# Patient Record
Sex: MALE | Race: WHITE | NOT HISPANIC OR LATINO | URBAN - METROPOLITAN AREA
[De-identification: names, ages, dates, MRNs, and addresses within clinical notes are randomized per-mention and may not be internally consistent; named-entity substitution may affect disease eponyms.]

---

## 2018-10-02 ENCOUNTER — OFFICE VISIT (OUTPATIENT)
Dept: FAMILY MEDICINE | Facility: CLINIC | Age: 28
End: 2018-10-02

## 2018-10-02 VITALS
DIASTOLIC BLOOD PRESSURE: 72 MMHG | WEIGHT: 215 LBS | HEART RATE: 80 BPM | SYSTOLIC BLOOD PRESSURE: 108 MMHG | BODY MASS INDEX: 29.12 KG/M2 | RESPIRATION RATE: 12 BRPM | HEIGHT: 72 IN

## 2018-10-02 DIAGNOSIS — R61 GENERALIZED HYPERHIDROSIS: Primary | ICD-10-CM

## 2018-10-02 PROCEDURE — 80053 COMPREHEN METABOLIC PANEL: CPT | Mod: 90 | Performed by: NURSE PRACTITIONER

## 2018-10-02 PROCEDURE — 99213 OFFICE O/P EST LOW 20 MIN: CPT | Performed by: NURSE PRACTITIONER

## 2018-10-02 PROCEDURE — 36415 COLL VENOUS BLD VENIPUNCTURE: CPT | Performed by: NURSE PRACTITIONER

## 2018-10-02 RX ORDER — OXYBUTYNIN CHLORIDE 5 MG/1
15 TABLET ORAL 2 TIMES DAILY
Qty: 180 TABLET | Refills: 3 | Status: SHIPPED | OUTPATIENT
Start: 2018-10-02 | End: 2018-12-31

## 2018-10-02 RX ORDER — OXYBUTYNIN CHLORIDE 5 MG/1
15 TABLET, EXTENDED RELEASE ORAL DAILY
COMMUNITY
End: 2019-08-09

## 2018-10-02 NOTE — LETTER
October 3, 2018      Jacob Gunn  [address] [facility] [address]        Dear ,    We are writing to inform you of your test results. CMP okay    Resulted Orders   Comp. Metabolic Panel (14) ([facility])   Result Value Ref Range    Glucose 99 65 - 99 mg/dL    Urea Nitrogen 12 6 - 20 mg/dL    Creatinine 0.81 0.76 - 1.27 mg/dL    eGFR If NonAfricn Am 121 >59 mL/min/1.73    eGFR If Africn Am 140 >59 mL/min/1.73    BUN/Creatinine Ratio 15 9 - 20    Sodium 143 134 - 144 mmol/L    Potassium 4.4 3.5 - 5.2 mmol/L    Chloride 103 96 - 106 mmol/L    Total CO2 26 20 - 29 mmol/L    Calcium 9.5 8.7 - 10.2 mg/dL    Protein Total 7.0 6.0 - 8.5 g/dL    Albumin 4.9 3.5 - 5.5 g/dL    Globulin, Total 2.1 1.5 - 4.5 g/dL    A/G Ratio 2.3 (H) 1.2 - 2.2    Bilirubin Total 1.1 0.0 - 1.2 mg/dL    Alkaline Phosphatase 36 (L) 39 - 117 IU/L    AST 16 0 - 40 IU/L    ALT 16 0 - 44 IU/L    Narrative    Performed at:  01 - [facility] [address], [place], [place]  [id number]  : Jong Carlson MD, Phone:  [phone]     If you have any questions or concerns, please call the clinic at the number listed above.     Sincerely,    CAMDEN Fatima CNP

## 2018-10-02 NOTE — PROGRESS NOTES
Received fax from pharmacy that patient's rx for Oxybutynin needs PA for quantity of #180.  Submitted PA by fax to [employer].  Will wait to hear back.

## 2018-10-02 NOTE — MR AVS SNAPSHOT
"              After Visit Summary   10/2/2018    Jacob Gunn    MRN: [id number]           Patient Information     Date Of Birth          1990        Visit Information        Provider Department      10/2/2018 8:15 AM Astrid Meyer APRN CNP [facility]        Today's Diagnoses     Generalized hyperhidrosis    -  1       Follow-ups after your visit        Follow-up notes from your care team     Return in about 1 year (around 10/2/2019).      Who to contact     If you have questions or need follow up information about today's clinic visit or your schedule please contact [facility] directly at [phone].  Normal or non-critical lab and imaging results will be communicated to you by MyChart, letter or phone within 4 business days after the clinic has received the results. If you do not hear from us within 7 days, please contact the clinic through [email or web address] or phone. If you have a critical or abnormal lab result, we will notify you by phone as soon as possible.  Submit refill requests through [employer] or call your pharmacy and they will forward the refill request to us. Please allow 3 business days for your refill to be completed.          Additional Information About Your Visit        MyChart Information     [employer] lets you send messages to your doctor, view your test results, renew your prescriptions, schedule appointments and more. To sign up, go to [email or web address] . Click on \"Log in\" on the left side of the screen, which will take you to the Welcome page. Then click on \"Sign up Now\" on the right side of the page.     You will be asked to enter the access code listed below, as well as some personal information. Please follow the directions to create your username and password.     Your access code is: [email or web address]  Expires: 2018  8:58 AM     Your access code will  in 90 days. If you need help or a new code, please call your [place] clinic or [phone].      "   Care EveryWhere ID     This is your Care EveryWhere ID. This could be used by other organizations to access your [facility] medical records  [id number]        Your Vitals Were     Pulse Respirations Height BMI (Body Mass Index)          80 12 1.829 m (6') 29.16 kg/m2         Blood Pressure from Last 3 Encounters:   10/02/18 108/72   08/14/13 130/72   05/19/11 128/82    Weight from Last 3 Encounters:   10/02/18 97.5 kg (215 lb)   08/14/13 122.9 kg (271 lb)   05/19/11 103.7 kg (228 lb 9.6 oz)              We Performed the Following     Comp. Metabolic Panel (14) ([facility])          Today's Medication Changes          These changes are accurate as of 10/2/18  8:58 AM.  If you have any questions, ask your nurse or doctor.               These medicines have changed or have updated prescriptions.        Dose/Directions    * oxybutynin 5 MG tablet   Commonly known as:  DITROPAN   This may have changed:  You were already taking a medication with the same name, and this prescription was added. Make sure you understand how and when to take each.   Used for:  Generalized hyperhidrosis   Changed by:  Astrid Meyer APRN CNP        Dose:  15 mg   Take 3 tablets (15 mg) by mouth 2 times daily   Quantity:  180 tablet   Refills:  3       * oxybutynin 5 MG 24 hr tablet   Commonly known as:  DITROPAN-XL   This may have changed:  Another medication with the same name was added. Make sure you understand how and when to take each.   Changed by:  Astrid Meyer APRN CNP        Dose:  15 mg   Take 15 mg by mouth daily sweats   Refills:  0       * Notice:  This list has 2 medication(s) that are the same as other medications prescribed for you. Read the directions carefully, and ask your doctor or other care provider to review them with you.         Where to get your medicines      These medications were sent to [id number] [place]  [address] [facility] [address]  [address]  [address]     Phone:  [phone]     oxybutynin 5 MG tablet                Primary Care Provider Office Phone # Fax #    Adrien Young [phone] [address]  [facility] [address]        Equal Access to Services     [facility] HospitalFL[facility] : Hadii aad ku hadasho Soomaali, waaxda luqadaha, qaybta kaalmada adeegyada, waxay idiin hayaan adeeg ervin laeduardon . So [facility] [phone].    ATENCIÓN: Si habla español, tiene a swanson disposición servicios gratuitos de asistencia lingüística. Llame al [phone].    We comply with applicable federal civil rights laws and [place] laws. We do not discriminate on the basis of race, color, national origin, age, disability, sex, sexual orientation, or gender identity.            Thank you!     Thank you for choosing [facility]  for your care. Our goal is always to provide you with excellent care. Hearing back from our patients is one way we can continue to improve our services. Please take a few minutes to complete the written survey that you may receive in the mail after your visit with us. Thank you!             Your Updated Medication List - Protect others around you: Learn how to safely use, store and throw away your medicines at www.disposemymeds.org.          This list is accurate as of 10/2/18  8:58 AM.  Always use your most recent med list.                   Brand Name Dispense Instructions for use Diagnosis    * oxybutynin 5 MG tablet    DITROPAN    180 tablet    Take 3 tablets (15 mg) by mouth 2 times daily    Generalized hyperhidrosis       * oxybutynin 5 MG 24 hr tablet    DITROPAN-XL     Take 15 mg by mouth daily sweats        * Notice:  This list has 2 medication(s) that are the same as other medications prescribed for you. Read the directions carefully, and ask your doctor or other care provider to review them with you.

## 2018-10-03 LAB
ALBUMIN SERPL-MCNC: 4.9 G/DL (ref 3.5–5.5)
ALBUMIN/GLOB SERPL: 2.3 {RATIO} (ref 1.2–2.2)
ALP SERPL-CCNC: 36 IU/L (ref 39–117)
ALT SERPL-CCNC: 16 IU/L (ref 0–44)
AST SERPL-CCNC: 16 IU/L (ref 0–40)
BILIRUB SERPL-MCNC: 1.1 MG/DL (ref 0–1.2)
BUN SERPL-MCNC: 12 MG/DL (ref 6–20)
BUN/CREATININE RATIO: 15 (ref 9–20)
CALCIUM SERPL-MCNC: 9.5 MG/DL (ref 8.7–10.2)
CHLORIDE SERPLBLD-SCNC: 103 MMOL/L (ref 96–106)
CREAT SERPL-MCNC: 0.81 MG/DL (ref 0.76–1.27)
EGFR IF AFRICN AM: 140 ML/MIN/1.73
EGFR IF NONAFRICN AM: 121 ML/MIN/1.73
GLOBULIN, TOTAL: 2.1 G/DL (ref 1.5–4.5)
GLUCOSE SERPL-MCNC: 99 MG/DL (ref 65–99)
POTASSIUM SERPL-SCNC: 4.4 MMOL/L (ref 3.5–5.2)
PROT SERPL-MCNC: 7 G/DL (ref 6–8.5)
SODIUM SERPL-SCNC: 143 MMOL/L (ref 134–144)
TOTAL CO2: 26 MMOL/L (ref 20–29)

## 2018-10-11 ENCOUNTER — TELEPHONE (OUTPATIENT)
Dept: FAMILY MEDICINE | Facility: CLINIC | Age: 28
End: 2018-10-11

## 2018-10-11 NOTE — TELEPHONE ENCOUNTER
Received fax from [employer] that patient's rx for Oxybutynin needs PA.   Faxed form to [employer].  Received fax back stating PA was approved.  Approval dates 10/02/2018-10/02/2019.  Called [employer] to inform of approval.

## 2019-08-09 ENCOUNTER — DOCUMENTATION ONLY (OUTPATIENT)
Dept: MATERNAL FETAL MEDICINE | Facility: CLINIC | Age: 29
End: 2019-08-09

## 2019-08-09 ENCOUNTER — OFFICE VISIT (OUTPATIENT)
Dept: FAMILY MEDICINE | Facility: CLINIC | Age: 29
End: 2019-08-09

## 2019-08-09 VITALS
DIASTOLIC BLOOD PRESSURE: 82 MMHG | WEIGHT: 220 LBS | SYSTOLIC BLOOD PRESSURE: 122 MMHG | HEART RATE: 62 BPM | OXYGEN SATURATION: 98 % | RESPIRATION RATE: 16 BRPM | BODY MASS INDEX: 29.84 KG/M2

## 2019-08-09 DIAGNOSIS — Z31.448 ENCOUNTER FOR OTHER GENETIC TESTING OF MALE FOR PROCREATIVE MANAGEMENT: Primary | ICD-10-CM

## 2019-08-09 DIAGNOSIS — Z31.448 ENCOUNTER FOR OTHER GENETIC TESTING OF MALE FOR PROCREATIVE MANAGEMENT: ICD-10-CM

## 2019-08-09 DIAGNOSIS — R61 GENERALIZED HYPERHIDROSIS: Primary | ICD-10-CM

## 2019-08-09 PROCEDURE — 40000954 ZZHCL STATISTIC COUNSYL FAMILY PREP: Performed by: OBSTETRICS & GYNECOLOGY

## 2019-08-09 PROCEDURE — 36415 COLL VENOUS BLD VENIPUNCTURE: CPT | Performed by: OBSTETRICS & GYNECOLOGY

## 2019-08-09 PROCEDURE — 99213 OFFICE O/P EST LOW 20 MIN: CPT | Performed by: FAMILY MEDICINE

## 2019-08-09 RX ORDER — OXYBUTYNIN CHLORIDE 5 MG/1
15 TABLET, EXTENDED RELEASE ORAL DAILY
Qty: 90 TABLET | Refills: 3 | Status: SHIPPED | OUTPATIENT
Start: 2019-08-09 | End: 2019-08-12

## 2019-08-09 NOTE — PROGRESS NOTES
.  Problem(s) Oriented visit        SUBJECTIVE:                                                    Jacob Gunn is a 29 year old male who presents to clinic today for the following health issues :    1. Generalized hyperhidrosis  Life is much improved on this medication  - oxybutynin ER (DITROPAN-XL) 5 MG 24 hr tablet; Take 3 tablets (15 mg) by mouth daily sweats  Dispense: 90 tablet; Refill: 3         Problem list, Medication list, Allergies, and Medical/Social/Surgical histories reviewed in [facility] and updated as appropriate.   Additional history: as documented    ROS:  General and CV completed and negative except as noted above    Histories:   Patient Active Problem List   Diagnosis   (none) - all problems resolved or deleted     No past surgical history on file.    Social History     Tobacco Use     Smoking status: Passive Smoke Exposure - Never Smoker     Smokeless tobacco: Former User     Types: Snuff, Chew   Substance Use Topics     Alcohol use: Yes     Alcohol/week: 0.0 - 1.2 oz     No family history on file.        OBJECTIVE:                                                    /82   Pulse 62   Resp 16   Wt 99.8 kg (220 lb)   SpO2 98%   BMI 29.84 kg/m    Body mass index is 29.84 kg/m .   APPEARANCE: = Relaxed and in no distress  Conj/Eyelids = noninjected and lids and lashes are without inflammation  PERRLA/Irises = Pupils Round Reactive to Light and Irisis without inflammation  Neck = No anterior or posterior adenopathy appreciated.  Thyroid = Not enlarged and no masses felt  Resp effort = Calm regular breathing  Breath Sounds = Good air movement with no rales or rhonchi in any lung fields  Heart Rate, Rhythm, & sounds (no Murm)  = Regular rate and rhythm with no S3, S4, or murmur appreciated.  Carotid Art's = Pulses full and equal and no bruits appreciated  Abdomen = Soft, nontender, no masses, & bowel sounds in all quadrants  Liver/Spleen = Normal span and no splenomegaly noted  Digits and Nails =  FROM in all finger joints, no nail dystrophy  Ext (edema) = No pretibial edema noted or elsewhere  Musculsktl =  Strength and ROM of major joints are within normal limits  SKIN = absent significant rashes or lesions   Recent/Remote Memory = Alert and Oriented x 3  Mood/Affect = Cooperative and interested     ASSESSMENT/PLAN:                                                        Jacob was seen today for recheck medication and cough.    Diagnoses and all orders for this visit:    Generalized hyperhidrosis  -     oxybutynin ER (DITROPAN-XL) 5 MG 24 hr tablet; Take 3 tablets (15 mg) by mouth daily sweats        Newly back in town.    The following health maintenance items are reviewed in Epic and correct as of today:  Health Maintenance   Topic Date Due     PREVENTIVE CARE VISIT  1990     HIV SCREENING  06/16/2005     PHQ-2  01/01/2019     INFLUENZA VACCINE (1) 09/01/2019     DTAP/TDAP/TD IMMUNIZATION (9 - Td) 06/17/2020     IPV IMMUNIZATION  Completed     MENINGITIS IMMUNIZATION  Aged Out       Adrien Young MD  [facility]  Family Practice  [facility]  [phone]    For any issues my office # is [phone]

## 2019-08-09 NOTE — PROGRESS NOTES
Jacob was seen in conjunction with his partner, Christine, today at her prenatal appointment at [facility]. Jacob consented to pursuing the Foresight Carrier Screen through [employer]. At his request, his partner will be informed of these results when they become available in 2-3 weeks.     Amy Cleveland MS, [facility]  Maternal Fetal Medicine  [facility]  Ph: [phone]  [email or web address]

## 2019-08-12 RX ORDER — OXYBUTYNIN CHLORIDE 15 MG/1
15 TABLET, EXTENDED RELEASE ORAL DAILY
Qty: 30 TABLET | Refills: 3 | Status: SHIPPED | OUTPATIENT
Start: 2019-08-12 | End: 2019-11-11

## 2019-08-12 NOTE — PROGRESS NOTES
Received fax from [employer] that patient's prescription for Oxybutynin ER 5 mg with directions 3 per day is not covered.  They will only pay for 1 per day.  Changed prescription to 15 mg with directions 1 per day.  Called patient to ask/inform him of the change. He is in agreement.  Sent prescription to pharmacy.

## 2019-08-22 ENCOUNTER — DOCUMENTATION ONLY (OUTPATIENT)
Dept: MATERNAL FETAL MEDICINE | Facility: CLINIC | Age: 29
End: 2019-08-22

## 2019-08-22 NOTE — PROGRESS NOTES
Per Jacob's request, I called his partner Christine today to discuss their Foresight Carrier Screen results via [employer]. Christine and Jacob were not found to be carriers for the same genetic conditions, therefore, no immediate risk for an affected fetus was identified through this screening.     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 This group of conditions include MDDG Type A3, MDDG Type B3, and MDDG Type C3. These conditions differ in severity, however, all individuals with these conditions have muscle weakness and possibly intellectual disability. Christine was not found to be a carrier for this group of conditions. Due to the inability to completely rule out the possibility of being a carrier through this screening, Christine and Jacob still have a chance of having a child with MDDG. Their chance of having an affected child prior to this screening was less than 1 in 1,000,000. This chance is now 1 in 50,000.     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 Christine was not found to be a carrier for this group of disorders. Their chance of having an affected child prior to this screening was 1 in 89,000. This chance is now 1 in 59,000.         Amy Cleveland MS, [facility]  Maternal Fetal Medicine  [facility]  Ph: [phone]  [email or web address]

## 2019-09-18 LAB — LAB SCANNED RESULT: NORMAL

## 2019-11-11 DIAGNOSIS — R61 GENERALIZED HYPERHIDROSIS: ICD-10-CM

## 2019-11-11 RX ORDER — OXYBUTYNIN CHLORIDE 15 MG/1
15 TABLET, EXTENDED RELEASE ORAL DAILY
Qty: 30 TABLET | Refills: 3 | Status: SHIPPED | OUTPATIENT
Start: 2019-11-11 | End: 2020-04-07

## 2019-11-25 ENCOUNTER — TELEPHONE (OUTPATIENT)
Dept: FAMILY MEDICINE | Facility: CLINIC | Age: 29
End: 2019-11-25

## 2019-11-25 NOTE — TELEPHONE ENCOUNTER
Received fax from [employer] 11/07  requesting PA for Oxybutinin 5 mg tabs with directions 3 tabs daily.  Submitted through [employer] on 11/08. Faxed another form also to [employer].  Received denial stating patient can get prescription for 15 mg taking 1 tablet daily but will not pay for 5 mg with 3 per day. Called patient and he says the 15 mg does not work as well for him as taking 3 of the 5 mg tabs.  He does have some of the 15 mg and will go back to these and if still problems will make an appointment.

## 2020-04-07 DIAGNOSIS — R61 GENERALIZED HYPERHIDROSIS: ICD-10-CM

## 2020-04-07 RX ORDER — OXYBUTYNIN CHLORIDE 15 MG/1
TABLET, EXTENDED RELEASE ORAL
Qty: 30 TABLET | Refills: 3 | Status: SHIPPED | OUTPATIENT
Start: 2020-04-07 | End: 2020-09-10

## 2020-09-10 DIAGNOSIS — R61 GENERALIZED HYPERHIDROSIS: ICD-10-CM

## 2020-09-10 RX ORDER — OXYBUTYNIN CHLORIDE 15 MG/1
TABLET, EXTENDED RELEASE ORAL
Qty: 30 TABLET | Refills: 3 | Status: SHIPPED | OUTPATIENT
Start: 2020-09-10 | End: 2021-01-19

## 2021-01-19 DIAGNOSIS — R61 GENERALIZED HYPERHIDROSIS: ICD-10-CM

## 2021-01-19 RX ORDER — OXYBUTYNIN CHLORIDE 15 MG/1
TABLET, EXTENDED RELEASE ORAL
Qty: 30 TABLET | Refills: 3 | Status: SHIPPED | OUTPATIENT
Start: 2021-01-19 | End: 2021-05-27

## 2021-01-19 NOTE — TELEPHONE ENCOUNTER
LMTCB--patient is due for a medication check    Orlando Hernandez,   [facility]  [phone]

## 2021-04-29 ENCOUNTER — OFFICE VISIT (OUTPATIENT)
Dept: FAMILY MEDICINE | Facility: CLINIC | Age: 31
End: 2021-04-29

## 2021-04-29 VITALS
SYSTOLIC BLOOD PRESSURE: 118 MMHG | WEIGHT: 286 LBS | BODY MASS INDEX: 37.91 KG/M2 | DIASTOLIC BLOOD PRESSURE: 82 MMHG | OXYGEN SATURATION: 98 % | HEIGHT: 73 IN | HEART RATE: 84 BPM

## 2021-04-29 DIAGNOSIS — R10.32 ABDOMINAL PAIN, LEFT LOWER QUADRANT: Primary | ICD-10-CM

## 2021-04-29 PROCEDURE — 99214 OFFICE O/P EST MOD 30 MIN: CPT | Performed by: FAMILY MEDICINE

## 2021-04-29 ASSESSMENT — MIFFLIN-ST. JEOR: SCORE: 2311.17

## 2021-04-29 NOTE — PROGRESS NOTES
Jacob Gunn is a 30 year old  male who presents with c/o femoral potential.  Jacob Gunn c/o bulging and burning which are made worse with coughing.  The hernia has been present a few weeks.      Jacob Gunn's chart has been reviewed for past medical and surgical history.  The pt's current allergies and medications were also reviewed.  The pt denies any prior anesthetic or bleeding complications of family hx of above and denies the use of herbal medications.    ROS:  PULM:  Denies, expectoration, hemoptysis, SOB and Cough  CV:  Denies, SUTTON, Orthopnea, Pedal edema, Tachycardia, chest pain and palpitations  GI:  Denies, Constipation, Diarrhea, Hematechezia, Melena, Nausea, Vomitting and ABD pain  :  Denies, hematuria, pyuria and dysuria    On physical exam,  PULM:  normal and clear to auscultation  CV:  regular rate and rhythm and no murmurs, clicks, or gallops  ABD:  Abdomen soft, non-tender. BS normal. No masses, organomegaly  I am unable to feel a hernia.    Assessment:  Possible femoral hernia     Plan:  Will send to see Dr. Haynes.

## 2021-05-03 ENCOUNTER — OFFICE VISIT (OUTPATIENT)
Dept: SURGERY | Facility: CLINIC | Age: 31
End: 2021-05-03
Attending: FAMILY MEDICINE
Payer: COMMERCIAL

## 2021-05-03 VITALS
WEIGHT: 286 LBS | BODY MASS INDEX: 37.91 KG/M2 | SYSTOLIC BLOOD PRESSURE: 120 MMHG | HEART RATE: 71 BPM | HEIGHT: 73 IN | DIASTOLIC BLOOD PRESSURE: 80 MMHG

## 2021-05-03 DIAGNOSIS — R10.32 LEFT GROIN PAIN: Primary | ICD-10-CM

## 2021-05-03 PROCEDURE — 99244 OFF/OP CNSLTJ NEW/EST MOD 40: CPT | Performed by: SURGERY

## 2021-05-03 ASSESSMENT — MIFFLIN-ST. JEOR: SCORE: 2311.17

## 2021-05-03 NOTE — PROGRESS NOTES
[facility]  Surgery Consultation    CONSULTATION REQUESTED BY:  HectorAdrien Juan [phone]    HPI: This patient is a 30-year-old gentleman referred by the above-mentioned provider for consultation regarding left lower abdominal pain.  He reports that approximately 2 weeks ago a flulike illness swept through his home.  He was affected as well as his wife and child.  Primary symptoms associated this were diarrhea.  He had some mild abdominal pain and one episode of vomiting.  This ultimately resolved and then approximately 1 week ago while walking and turning he reports that he felt a twinge of pain in his left lower abdomen.  This increased while he was at work.  He states that the pain would wax and wane over the course of the day.  Thought that this was more noticeable when standing.  He felt that he had to slouch to control the discomfort.  He felt that something was pushing in his lower abdomen.  This got worse over the course of last week to the point where on Thursday this was quite severe.  He could not get comfortable and felt some radiation of pain into his leg along his hip flexors.  At that time he had no further GI symptoms.  He did report feeling somewhat bloated.  He described a burning type sensation on his left lower abdomen.  He was evaluated by primary care.  There was some discussion about imaging but this was not pursued.  He reports that the day after being evaluated by primary care his symptoms dramatically improved.  States that most of the symptoms are now basically resolved.  He does still feel some mild pressure in the area but not any pain.    PMH:   has no past medical history on file.  PSH:    has no past surgical history on file.  Social History:   reports that he is a non-smoker but has been exposed to tobacco smoke. He quit smokeless tobacco use about 7 years ago.  His smokeless tobacco use included snuff and chew. He reports current alcohol use. He reports  "that he does not use drugs.  Family History:  family history is not on file.  Medications/Allergies: Home medications and allergies reviewed.    ROS:  The 10 point Review of Systems is negative other than noted in the HPI.    Physical Exam:  /80   Pulse 71   Ht 1.854 m (6' 1\")   Wt 129.7 kg (286 lb)   BMI 37.73 kg/m    GENERAL: Generally appears well.  Psych: Alert and Oriented.  Normal affect  Eyes: Sclera clear  Respiratory:  Lungs clear to ausculation bilaterally with good air excursion  Cardiovascular:  Regular Rate and Rhythm with no murmurs gallops or rubs, normal peripheral pulses  GI: Abdomen Non Distended Non-Tender  No hernias palpated..  Groin- I examined the patient in both the standing and supine positions. Right Groin- No hernia Palpated.  No tenderness on palpation. Left Groin- No hernia Palpated.  Minimal pain to deep palpation.  No scrotal or testicle abnormalities.  Lymphatic/Hematologic/Immune:  No femoral or cervical lymphadenopathy.  Integumentary:  No rashes  Neurological: grossly intact     All new lab and imaging data was reviewed.     Impression and Plan:  Patient is a 30 year old male with left groin pain of unclear etiology    PLAN: In the absence of identifiable hernia and in the circumstance of significant symptomatic improvement, at present I see no indication to proceed with additional intervention or imaging at this time.  Unclear as to what his overall source of discomfort was.  This was discussed with him.  Should there be a recurrence would suggest possible ultrasound versus CT.  I would likely pursue a CT.  He felt comfortable with this at this time.  He was encouraged to call back or revisit in the office should his symptoms return.    Thank you very much for this consult.    Seamus Haynes M.D.  [facility]  [phone]    Please route or send letter to:  Primary Care Provider (PCP) and Referring Provider    "

## 2021-05-27 DIAGNOSIS — R61 GENERALIZED HYPERHIDROSIS: ICD-10-CM

## 2021-05-27 RX ORDER — OXYBUTYNIN CHLORIDE 15 MG/1
TABLET, EXTENDED RELEASE ORAL
Qty: 30 TABLET | Refills: 3 | Status: SHIPPED | OUTPATIENT
Start: 2021-05-27

## 2021-05-27 NOTE — TELEPHONE ENCOUNTER
Oxybutynin. Last addressed 8/9/19.  LOV 4/29/21.        Life is much improved on this medication  - oxybutynin ER (DITROPAN-XL) 5 MG 24 hr tablet; Take 3 tablets (15 mg) by mouth daily sweats  Dispense: 90 tablet; Refill: 3

## 2023-01-28 ENCOUNTER — TRANSFERRED RECORDS (OUTPATIENT)
Dept: FAMILY MEDICINE | Facility: CLINIC | Age: 33
End: 2023-01-28

## 2023-02-01 ENCOUNTER — TRANSFERRED RECORDS (OUTPATIENT)
Dept: FAMILY MEDICINE | Facility: CLINIC | Age: 33
End: 2023-02-01

## 2023-02-03 NOTE — PATIENT INSTRUCTIONS
For informational purposes only. Not to replace the advice of your health care provider. Copyright   2003,  [facility]. All rights reserved. Clinically reviewed by Stephanie Saab MD. [employer] [id number] - REV .  Preparing for Your Surgery  Getting started  A nurse will call you to review your health history and instructions. They will give you an arrival time based on your scheduled surgery time. Please be ready to share:    Your doctor's clinic name and phone number    Your medical, surgical, and anesthesia history    A list of allergies and sensitivities    A list of medicines, including herbal treatments and over-the-counter drugs    Whether the patient has a legal guardian (ask how to send us the papers in advance)  Please tell us if you're pregnant--or if there's any chance you might be pregnant. Some surgeries may injure a fetus (unborn baby), so they require a pregnancy test. Surgeries that are safe for a fetus don't always need a test, and you can choose whether to have one.   If you have a child who's having surgery, please ask for a copy of Preparing for Your Child's Surgery.    Preparing for surgery    Within 10 to 30 days of surgery: Have a pre-op exam (sometimes called an H&P, or History and Physical). This can be done at a clinic or pre-operative center.  ? If you're having a , you may not need this exam. Talk to your care team.    At your pre-op exam, talk to your care team about all medicines you take. If you need to stop any medicines before surgery, ask when to start taking them again.  ? We do this for your safety. Many medicines can make you bleed too much during surgery. Some change how well surgery (anesthesia) drugs work.    Call your insurance company to let them know you're having surgery. (If you don't have insurance, call [phone].)    Call your clinic if there's any change in your health. This includes signs of a cold or flu (sore throat, runny nose,  cough, rash, fever). It also includes a scrape or scratch near the surgery site.    If you have questions on the day of surgery, call your hospital or surgery center.  Eating and drinking guidelines  For your safety: Unless your surgeon tells you otherwise, follow the guidelines below.    Eat and drink as usual until 8 hours before you arrive for surgery. After that, no food or milk.    Drink clear liquids until 2 hours before you arrive. These are liquids you can see through, like water, Gatorade, and Propel Water. They also include plain black coffee and tea (no cream or milk), candy, and breath mints. You can spit out gum when you arrive.    If you drink alcohol: Stop drinking it the night before surgery.    If your care team tells you to take medicine on the morning of surgery, it's okay to take it with a sip of water.  Preventing infection    Shower or bathe the night before and morning of your surgery. Follow the instructions your clinic gave you. (If no instructions, use regular soap.)    Don't shave or clip hair near your surgery site. We'll remove the hair if needed.    Don't smoke or vape the morning of surgery. You may chew nicotine gum up to 2 hours before surgery. A nicotine patch is okay.  ? Note: Some surgeries require you to completely quit smoking and nicotine. Check with your surgeon.    Your care team will make every effort to keep you safe from infection. We will:  ? Clean our hands often with soap and water (or an alcohol-based hand rub).  ? Clean the skin at your surgery site with a special soap that kills germs.  ? Give you a special gown to keep you warm. (Cold raises the risk of infection.)  ? Wear special hair covers, masks, gowns and gloves during surgery.  ? Give antibiotic medicine, if prescribed. Not all surgeries need antibiotics.  What to bring on the day of surgery    Photo ID and insurance card    Copy of your health care directive, if you have one    Glasses and hearing aids (bring  cases)  ? You can't wear contacts during surgery    Inhaler and eye drops, if you use them (tell us about these when you arrive)    CPAP machine or breathing device, if you use them    A few personal items, if spending the night    If you have . . .  ? A pacemaker, ICD (cardiac defibrillator) or other implant: Bring the ID card.  ? An implanted stimulator: Bring the remote control.  ? A legal guardian: Bring a copy of the certified (court-stamped) guardianship papers.  Please remove any jewelry, including body piercings. Leave jewelry and other valuables at home.  If you're going home the day of surgery    You must have a responsible adult drive you home. They should stay with you overnight as well.    If you don't have someone to stay with you, and you aren't safe to go home alone, we may keep you overnight. Insurance often won't pay for this.  After surgery  If it's hard to control your pain or you need more pain medicine, please call your surgeon's office.  Questions?   If you have any questions for your care team, list them here: _________________________________________________________________________________________________________________________________________________________________________ ____________________________________ ____________________________________ ____________________________________

## 2023-02-03 NOTE — PROGRESS NOTES
[facility]  [address] [place] [address]  Phone: [phone]  Fax: [phone]  Primary Provider: Ariana Young  Pre-op Performing Provider: ARIANA YOUNG      PREOPERATIVE EVALUATION:  Today's date: 2/7/2023  Preoperative Questionnaire:   No - Have you ever had a heart attack or stroke?  No - Have you ever had surgery on your heart or blood vessels, such as a stent, coronary (heart) bypass, or surgery on an artery in the head, neck, heart, or legs?  No - Do you have chest pain when you are physically active?  No - Do you have a history of heart failure?  No - Do you currently have a cold, bronchitis, or symptoms of other respiratory (head and chest) infections?  Yes - Do you have a cough, shortness of breath, or wheezing? Runny nose, light cough 1-2 times per day   No - Do you or anyone in your family have a history of blood clots?  No - Do you or anyone in your family have a serious bleeding problem, such as long-lasting bleeding after surgeries or cuts?  No - Have you ever had anemia or been told to take iron pills?  No - Have you had any abnormal blood loss such as black, tarry or bloody stools, or abnormal vaginal bleeding?  No - Have you ever had a blood transfusion?  Yes - Are you willing to have a blood transfusion if it is medically needed before, during, or after your surgery?  No - Have you or anyone in your family ever had problems with anesthesia (sedation for surgery)?  No - Do you have sleep apnea, excessive snoring, or daytime drowsiness?   No - Do you have any artifical heart valves or other implanted medical devices, such as a pacemaker, defibrillator, or continuous glucose monitor?  No - Do you have any artifical joints?  No - Are you allergic to latex?  No - Is there any chance that you may be pregnant?          Jacob Gunn is a 32 year old male who presents for a preoperative evaluation.    Surgical Information:  Surgery/Procedure: Distal Bicep  rupture- right elbow    Surgery Location: [facility]  Surgeon: Dr Faust   Surgery Date: 2/10/23  Time of Surgery: 8:15am  Where patient plans to recover: At home with family  Fax number for surgical facility: [phone]    Type of Anesthesia Anticipated: to be determined    Assessment & Plan     The proposed surgical procedure is considered LOW risk.    Preop general physical exam  for  Tear of right biceps muscle, subsequent encounter      Risks and Recommendations:  The patient has the following additional risks and recommendations for perioperative complications:   - No identified additional risk factors other than previously addressed    Medication Instructions:  Patient is on no chronic medications    RECOMMENDATION:  APPROVAL GIVEN to proceed with proposed procedure, without further diagnostic evaluation.    Subjective     HPI related to upcoming procedure: Ripped distal bicep stopping an adult in a sled.  Feels week and fatigue even typing.    Preoperative Review of :   reviewed - no record of controlled substances prescribed.      Status of Chronic Conditions:  See problem list for active medical problems.  Problems all longstanding and stable, except as noted/documented.  See ROS for pertinent symptoms related to these conditions.      Review of Systems  Constitutional, neuro, ENT, endocrine, pulmonary, cardiac, gastrointestinal, genitourinary, musculoskeletal, integument and psychiatric systems are negative, except as otherwise noted.    There are no problems to display for this patient.     No past medical history on file.  No past surgical history on file.  Current Outpatient Medications   Medication Sig Dispense Refill     oxybutynin ER (DITROPAN XL) 15 MG 24 hr tablet TAKE 1 TABLET(15 MG) BY MOUTH DAILY (Patient not taking: Reported on 2/7/2023) 30 tablet 3       No Known Allergies     Social History     Tobacco Use     Smoking status: Passive Smoke Exposure - Never Smoker     Smokeless  "tobacco: Former     Types: Snuff, Chew     Quit date: 10/2/2013   Substance Use Topics     Alcohol use: Yes     Alcohol/week: 0.0 - 2.0 standard drinks     No family history on file.  History   Drug Use No         Objective     /82   Pulse 76   Temp 97.3  F (36.3  C) (Temporal)   Resp 16   Ht 1.842 m (6' 0.5\")   Wt 125.1 kg (275 lb 12.8 oz)   SpO2 96%   BMI 36.89 kg/m      Physical Exam    GENERAL APPEARANCE: healthy, alert and no distress     EYES: EOMI,  PERRL     HENT: ear canals and TM's normal and nose and mouth without ulcers or lesions     NECK: no adenopathy, no asymmetry, masses, or scars and thyroid normal to palpation     RESP: lungs clear to auscultation - no rales, rhonchi or wheezes     CV: regular rates and rhythm, normal S1 S2, no S3 or S4 and no murmur, click or rub     ABDOMEN:  soft, nontender, no HSM or masses and bowel sounds normal     MS: extremities normal- no gross deformities noted, no evidence of inflammation in joints, FROM in all extremities, except for a torn right bicep     SKIN: no suspicious lesions or rashes     NEURO: Normal strength and tone, sensory exam grossly normal, mentation intact and speech normal     PSYCH: mentation appears normal. and affect normal/bright     LYMPHATICS: No cervical adenopathy    No results for input(s): HGB, PLT, INR, NA, POTASSIUM, CR, A1C in the last 01298 hours.     Diagnostics:  No labs were ordered during this visit.   No EKG required for low risk surgery (cataract, skin procedure, breast biopsy, etc).    Revised Cardiac Risk Index (RCRI):  The patient has the following serious cardiovascular risks for perioperative complications:   - No serious cardiac risks = 0 points     RCRI Interpretation: 0 points: Class I (very low risk - 0.4% complication rate)       Signed Electronically by: Adrien Young MD  Copy of this evaluation report is provided to requesting physician.      "

## 2023-02-07 ENCOUNTER — OFFICE VISIT (OUTPATIENT)
Dept: FAMILY MEDICINE | Facility: CLINIC | Age: 33
End: 2023-02-07

## 2023-02-07 VITALS
TEMPERATURE: 97.3 F | SYSTOLIC BLOOD PRESSURE: 122 MMHG | HEART RATE: 76 BPM | RESPIRATION RATE: 16 BRPM | OXYGEN SATURATION: 96 % | WEIGHT: 275.8 LBS | HEIGHT: 73 IN | DIASTOLIC BLOOD PRESSURE: 82 MMHG | BODY MASS INDEX: 36.55 KG/M2

## 2023-02-07 DIAGNOSIS — Z01.818 PREOP GENERAL PHYSICAL EXAM: Primary | ICD-10-CM

## 2023-02-07 DIAGNOSIS — R39.15 URINARY URGENCY: ICD-10-CM

## 2023-02-07 DIAGNOSIS — S46.211D TEAR OF RIGHT BICEPS MUSCLE, SUBSEQUENT ENCOUNTER: ICD-10-CM

## 2023-02-07 PROCEDURE — 99215 OFFICE O/P EST HI 40 MIN: CPT | Performed by: FAMILY MEDICINE

## 2023-02-15 NOTE — PROGRESS NOTES
2/7/23 faxed this preop to [facility] @ [phone]    Orlando Hernandez,   [facility]  [phone]

## 2023-02-22 ENCOUNTER — TRANSFERRED RECORDS (OUTPATIENT)
Dept: FAMILY MEDICINE | Facility: CLINIC | Age: 33
End: 2023-02-22

## 2023-03-27 ENCOUNTER — TRANSFERRED RECORDS (OUTPATIENT)
Dept: FAMILY MEDICINE | Facility: CLINIC | Age: 33
End: 2023-03-27

## 2023-05-08 ENCOUNTER — TRANSFERRED RECORDS (OUTPATIENT)
Dept: FAMILY MEDICINE | Facility: CLINIC | Age: 33
End: 2023-05-08

## 2023-08-10 ENCOUNTER — OFFICE VISIT (OUTPATIENT)
Dept: FAMILY MEDICINE | Facility: CLINIC | Age: 33
End: 2023-08-10

## 2023-08-10 VITALS
OXYGEN SATURATION: 95 % | DIASTOLIC BLOOD PRESSURE: 74 MMHG | BODY MASS INDEX: 37.4 KG/M2 | WEIGHT: 279.6 LBS | HEART RATE: 67 BPM | SYSTOLIC BLOOD PRESSURE: 138 MMHG

## 2023-08-10 DIAGNOSIS — L30.8 PSORIASIFORM DERMATITIS: ICD-10-CM

## 2023-08-10 DIAGNOSIS — R59.1 LYMPHADENOPATHY: Primary | ICD-10-CM

## 2023-08-10 LAB
% GRANULOCYTES: 57 % (ref 42.2–75.2)
ALBUMIN SERPL BCG-MCNC: 5 G/DL (ref 3.5–5.2)
ALP SERPL-CCNC: 56 U/L (ref 40–129)
ALT SERPL W P-5'-P-CCNC: 31 U/L (ref 0–70)
ANION GAP SERPL CALCULATED.3IONS-SCNC: 13 MMOL/L (ref 7–15)
AST SERPL W P-5'-P-CCNC: 22 U/L (ref 0–45)
BILIRUB SERPL-MCNC: 1.2 MG/DL
BUN SERPL-MCNC: 9.4 MG/DL (ref 6–20)
CALCIUM SERPL-MCNC: 9.5 MG/DL (ref 8.6–10)
CHLORIDE SERPL-SCNC: 104 MMOL/L (ref 98–107)
CREAT SERPL-MCNC: 0.91 MG/DL (ref 0.67–1.17)
DEPRECATED HCO3 PLAS-SCNC: 24 MMOL/L (ref 22–29)
GFR SERPL CREATININE-BSD FRML MDRD: >90 ML/MIN/1.73M2
GLUCOSE SERPL-MCNC: 92 MG/DL (ref 70–99)
HCT VFR BLD AUTO: 43 % (ref 39–51)
HEMOGLOBIN: 14.4 G/DL (ref 13.4–17.5)
LYMPHOCYTES NFR BLD AUTO: 35.5 % (ref 20.5–51.1)
MCH RBC QN AUTO: 27.8 PG (ref 27–31)
MCHC RBC AUTO-ENTMCNC: 33.6 G/DL (ref 33–37)
MCV RBC AUTO: 82.9 FL (ref 80–100)
MONOCYTES NFR BLD AUTO: 7.5 % (ref 1.7–9.3)
PLATELET # BLD AUTO: 263 K/UL (ref 140–450)
POTASSIUM SERPL-SCNC: 3.8 MMOL/L (ref 3.4–5.3)
PROT SERPL-MCNC: 7.8 G/DL (ref 6.4–8.3)
RBC # BLD AUTO: 5.19 X10/CMM (ref 4.2–5.9)
SODIUM SERPL-SCNC: 141 MMOL/L (ref 136–145)
WBC # BLD AUTO: 7 X10/CMM (ref 3.8–11)

## 2023-08-10 PROCEDURE — 85025 COMPLETE CBC W/AUTO DIFF WBC: CPT | Performed by: FAMILY MEDICINE

## 2023-08-10 PROCEDURE — 99213 OFFICE O/P EST LOW 20 MIN: CPT | Performed by: FAMILY MEDICINE

## 2023-08-10 PROCEDURE — 36415 COLL VENOUS BLD VENIPUNCTURE: CPT | Performed by: FAMILY MEDICINE

## 2023-08-10 PROCEDURE — 80053 COMPREHEN METABOLIC PANEL: CPT | Performed by: FAMILY MEDICINE

## 2023-08-10 RX ORDER — TRIAMCINOLONE ACETONIDE 5 MG/G
OINTMENT TOPICAL
Qty: 30 G | Refills: 1 | Status: SHIPPED | OUTPATIENT
Start: 2023-08-10

## 2023-08-10 NOTE — LETTER
August 14, 2023      Jacob Gunn  [address]  [place] [address]        Dear Jacob,     I am writing to report that your included test results are as expected.    Many labs contain some results that are slightly outside of the normal range, I have reviewed any of these results and they require no changes at this time.     Adrien Young MD     Resulted Orders   CBC with Diff/Plt ([facility])   Result Value Ref Range    WBC x10/cmm 7.0 3.8 - 11.0 x10/cmm    % Lymphocytes 35.5 20.5 - 51.1 %    % Monocytes 7.5 1.7 - 9.3 %    % Granulocytes 57.0 42.2 - 75.2 %    RBC x10/cmm 5.19 4.2 - 5.9 x10/cmm    Hemoglobin 14.4 13.4 - 17.5 g/dl    Hematocrit 43.0 39 - 51 %    MCV 82.9 80 - 100 fL    MCH 27.8 27.0 - 31.0 pg    MCHC 33.6 33.0 - 37.0 g/dL    Platelet Count 263 140 - 450 K/uL   Comprehensive metabolic panel   Result Value Ref Range    Sodium 141 136 - 145 mmol/L    Potassium 3.8 3.4 - 5.3 mmol/L    Chloride 104 98 - 107 mmol/L    Carbon Dioxide (CO2) 24 22 - 29 mmol/L    Anion Gap 13 7 - 15 mmol/L    Urea Nitrogen 9.4 6.0 - 20.0 mg/dL    Creatinine 0.91 0.67 - 1.17 mg/dL    Calcium 9.5 8.6 - 10.0 mg/dL    Glucose 92 70 - 99 mg/dL    Alkaline Phosphatase 56 40 - 129 U/L    AST 22 0 - 45 U/L      Comment:      Reference intervals for this test were updated on 6/12/2023 to more accurately reflect our healthy population. There may be differences in the flagging of prior results with similar values performed with this method. Interpretation of those prior results can be made in the context of the updated reference intervals.    ALT 31 0 - 70 U/L      Comment:      Reference intervals for this test were updated on 6/12/2023 to more accurately reflect our healthy population. There may be differences in the flagging of prior results with similar values performed with this method. Interpretation of those prior results can be made in the context of the updated reference intervals.      Protein Total 7.8 6.4 - 8.3 g/dL    Albumin 5.0  3.5 - 5.2 g/dL    Bilirubin Total 1.2 <=1.2 mg/dL    GFR Estimate >90 >60 mL/min/1.73m2       If you have any questions or concerns, please call the clinic at the number listed above.       Sincerely,      Adrien Young MD

## 2023-08-11 NOTE — RESULT ENCOUNTER NOTE
Dear Jacob,     I am writing to report that your included test results are as expected.    Many labs contain some results that are slightly outside of the normal range, I have reviewed any of these results and they require no changes at this time.    Adrien Young MD

## 2023-09-11 NOTE — PROGRESS NOTES
8/31/23 Faxed this office note to [facility] for reconsideration @ [phone]    Case # [id number]    Orlando Hernandez,   [facility]  [phone]

## 2023-12-05 NOTE — PROGRESS NOTES
Problem(s) Oriented visit        SUBJECTIVE:                                                    Jacob Gunn is a 28 year old male who presents to clinic today for the following health issues :   Here for refill on medication for sweating, brings bottle in.  Has been getting it in [place]. Just moved back. Take 15 mg once or twice daily and has good control.       Problem list, Medication list, Allergies, and Medical/Social/Surgical histories reviewed in EPIC and updated as appropriate.   Additional history: as documented    ROS:  5 point ROS completed and negative except noted above, including Gen, CV, Resp, GI, MS    Histories:   Patient Active Problem List   Diagnosis   (none) - all problems resolved or deleted     No past surgical history on file.    Social History   Substance Use Topics     Smoking status: Passive Smoke Exposure - Never Smoker     Smokeless tobacco: Former User     Types: Snuff, Chew     Quit date: 10/2/2013     Alcohol use 0.0 - 1.2 oz/week     0 - 2 Standard drinks or equivalent per week     No family history on file.        OBJECTIVE:                                                    /72  Pulse 80  Resp 12  Ht 1.829 m (6')  Wt 97.5 kg (215 lb)  BMI 29.16 kg/m2  Body mass index is 29.16 kg/(m^2).   APPEARANCE: = Relaxed and in no distress  Conj/Eyelids = noninjected and lids and lashes are without inflammation  Neck = No anterior or posterior adenopathy appreciated.  Thyroid = Not enlarged and no masses felt  Resp effort = Calm regular breathing  Breath Sounds = Good air movement with no rales or rhonchi in any lung fields  Heart Rate, Rhythm, & sounds (no Murm)  = Regular rate and rhythm with no S3, S4, or murmur appreciated.  Abdomen = Soft, nontender, no masses, & bowel sounds in all quadrants  Musculsktl =  Strength and ROM of major joints are within normal limits  SKIN = absent significant rashes or lesions   Recent/Remote Memory = Alert and Oriented x 3  Quality 226: Preventive Care And Screening: Tobacco Use: Screening And Cessation Intervention: Patient screened for tobacco use and is an ex/non-smoker     ASSESSMENT/PLAN:                                                    1. Generalized hyperhidrosis  - Comp. Metabolic Panel (14) ([facility])  - oxybutynin (DITROPAN) 5 MG tablet; Take 3 tablets (15 mg) by mouth 2 times daily  Dispense: 180 tablet; Refill: 3      FUTURE APPOINTMENTS:       - Follow-up for annual visit or as needed    The following health maintenance items are reviewed in Epic and correct as of today:  Health Maintenance   Topic Date Due     PHQ-2 Q1 YR  06/16/2002     HIV SCREEN (SYSTEM ASSIGNED)  06/16/2008     TETANUS Q10 YR  08/12/2012     INFLUENZA VACCINE (1) 09/01/2018       CAMDEN Fatima CNP  [facility]  Family Practice  [facility]  [phone]    For any issues my office # is [phone]       Quality 111:Pneumonia Vaccination Status For Older Adults: Patient did not receive any pneumococcal conjugate or polysaccharide vaccine on or after their 60th birthday and before the end of the measurement period Quality 110: Preventive Care And Screening: Influenza Immunization: Influenza immunization was not ordered or administered, reason not given Detail Level: Detailed

## 2024-05-14 ENCOUNTER — OFFICE VISIT (OUTPATIENT)
Dept: FAMILY MEDICINE | Facility: CLINIC | Age: 34
End: 2024-05-14

## 2024-05-14 VITALS
SYSTOLIC BLOOD PRESSURE: 152 MMHG | DIASTOLIC BLOOD PRESSURE: 87 MMHG | WEIGHT: 302.4 LBS | HEIGHT: 73 IN | OXYGEN SATURATION: 96 % | BODY MASS INDEX: 40.08 KG/M2 | HEART RATE: 92 BPM

## 2024-05-14 DIAGNOSIS — M62.838 NECK MUSCLE SPASM: Primary | ICD-10-CM

## 2024-05-14 DIAGNOSIS — R03.0 ELEVATED BLOOD PRESSURE READING WITHOUT DIAGNOSIS OF HYPERTENSION: ICD-10-CM

## 2024-05-14 DIAGNOSIS — M62.830 BACK MUSCLE SPASM: ICD-10-CM

## 2024-05-14 PROCEDURE — 99214 OFFICE O/P EST MOD 30 MIN: CPT | Performed by: FAMILY MEDICINE

## 2024-05-14 RX ORDER — CYCLOBENZAPRINE HCL 10 MG
10 TABLET ORAL 3 TIMES DAILY PRN
Qty: 30 TABLET | Refills: 0 | Status: SHIPPED | OUTPATIENT
Start: 2024-05-14

## 2024-05-14 RX ORDER — NAPROXEN 500 MG/1
500 TABLET ORAL 2 TIMES DAILY WITH MEALS
Qty: 30 TABLET | Refills: 0 | Status: SHIPPED | OUTPATIENT
Start: 2024-05-14 | End: 2024-05-28

## 2024-05-14 NOTE — PROGRESS NOTES
"  Vriginia James is a 33 year old patient who presents to clinic for evaluation.  Reports 4-5 days ago had right sided neck and upper back pain and spasm.  No known injury or trauma.  No swelling or rash.  He has been using high doses of acetaminophen and ibuprofen without improvement.  He has seen a chiropractor twice with mild improvement.  He had some right arm radicular sounding symptoms that he reports resolved after seeing the chiropractor.        Review of Systems   Constitutional, HEENT, cardiovascular, pulmonary, GI, , musculoskeletal, neuro, skin, endocrine and psych systems are negative, except as otherwise noted.      Objective    BP (!) 152/87   Pulse 92   Ht 1.842 m (6' 0.5\")   Wt 137.2 kg (302 lb 6.4 oz)   SpO2 96%   BMI 40.45 kg/m      General: sitting stooped in chair  MSK: stooped posture with neck flexed and tilted.  Reduced ROM with extension and right rotation.  No midline tenderness on cervical spine.  Normal UE strength.  Normal sensation.  Normal reflexes.    Psych: normal mood and affect        No results found for this or any previous visit (from the past 24 hour(s)).    Neck muscle spasm  Exam consistent with muscle spasm  Trial of naproxen, flexeril and APAP  Stretching, heat  Discussed importance of not exceeding recommended doses  Proper use and potential benefits, harms and side effects discussed in detail.  - naproxen (NAPROSYN) 500 MG tablet; Take 1 tablet (500 mg) by mouth 2 times daily (with meals)  - cyclobenzaprine (FLEXERIL) 10 MG tablet; Take 1 tablet (10 mg) by mouth 3 times daily as needed for muscle spasms    Back muscle spasm  - naproxen (NAPROSYN) 500 MG tablet; Take 1 tablet (500 mg) by mouth 2 times daily (with meals)  - cyclobenzaprine (FLEXERIL) 10 MG tablet; Take 1 tablet (10 mg) by mouth 3 times daily as needed for muscle spasms    Elevated blood pressure reading without diagnosis of hypertension  Recheck after symptoms resolve    Patient is agreement " with the assessment and plan as outlined above.  All questions answered.  Red flag symptoms that should prompt emergent evaluation discussed and understood.

## 2024-05-26 DIAGNOSIS — M62.830 BACK MUSCLE SPASM: ICD-10-CM

## 2024-05-26 DIAGNOSIS — M62.838 NECK MUSCLE SPASM: ICD-10-CM

## 2024-05-28 RX ORDER — NAPROXEN 500 MG/1
500 TABLET ORAL 2 TIMES DAILY WITH MEALS
Qty: 30 TABLET | Refills: 0 | Status: SHIPPED | OUTPATIENT
Start: 2024-05-28
